# Patient Record
Sex: MALE | Race: OTHER | NOT HISPANIC OR LATINO | URBAN - METROPOLITAN AREA
[De-identification: names, ages, dates, MRNs, and addresses within clinical notes are randomized per-mention and may not be internally consistent; named-entity substitution may affect disease eponyms.]

---

## 2019-05-28 ENCOUNTER — EMERGENCY (EMERGENCY)
Facility: HOSPITAL | Age: 39
LOS: 1 days | Discharge: HOME | End: 2019-05-28
Attending: EMERGENCY MEDICINE | Admitting: EMERGENCY MEDICINE
Payer: COMMERCIAL

## 2019-05-28 VITALS
DIASTOLIC BLOOD PRESSURE: 75 MMHG | OXYGEN SATURATION: 99 % | HEART RATE: 92 BPM | TEMPERATURE: 98 F | SYSTOLIC BLOOD PRESSURE: 145 MMHG | RESPIRATION RATE: 18 BRPM | WEIGHT: 166.89 LBS

## 2019-05-28 LAB
ANION GAP SERPL CALC-SCNC: 8 MMOL/L — LOW (ref 9–16)
BASOPHILS NFR BLD AUTO: 0.8 % — SIGNIFICANT CHANGE UP (ref 0–2)
BUN SERPL-MCNC: 15 MG/DL — SIGNIFICANT CHANGE UP (ref 7–23)
CALCIUM SERPL-MCNC: 8.5 MG/DL — SIGNIFICANT CHANGE UP (ref 8.5–10.5)
CHLORIDE SERPL-SCNC: 105 MMOL/L — SIGNIFICANT CHANGE UP (ref 96–108)
CO2 SERPL-SCNC: 28 MMOL/L — SIGNIFICANT CHANGE UP (ref 22–31)
CREAT SERPL-MCNC: 1.23 MG/DL — SIGNIFICANT CHANGE UP (ref 0.5–1.3)
EOSINOPHIL NFR BLD AUTO: 0.9 % — SIGNIFICANT CHANGE UP (ref 0–6)
GLUCOSE SERPL-MCNC: 134 MG/DL — HIGH (ref 70–99)
HCT VFR BLD CALC: 44.2 % — SIGNIFICANT CHANGE UP (ref 39–50)
HGB BLD-MCNC: 14.9 G/DL — SIGNIFICANT CHANGE UP (ref 13–17)
IMM GRANULOCYTES NFR BLD AUTO: 0.3 % — SIGNIFICANT CHANGE UP (ref 0–1.5)
LYMPHOCYTES # BLD AUTO: 27.1 % — SIGNIFICANT CHANGE UP (ref 13–44)
MCHC RBC-ENTMCNC: 27.3 PG — SIGNIFICANT CHANGE UP (ref 27–34)
MCHC RBC-ENTMCNC: 33.7 G/DL — SIGNIFICANT CHANGE UP (ref 32–36)
MCV RBC AUTO: 81 FL — SIGNIFICANT CHANGE UP (ref 80–100)
MONOCYTES NFR BLD AUTO: 8 % — SIGNIFICANT CHANGE UP (ref 2–14)
NEUTROPHILS NFR BLD AUTO: 62.9 % — SIGNIFICANT CHANGE UP (ref 43–77)
PLATELET # BLD AUTO: 174 K/UL — SIGNIFICANT CHANGE UP (ref 150–400)
POTASSIUM SERPL-MCNC: 3.9 MMOL/L — SIGNIFICANT CHANGE UP (ref 3.5–5.3)
POTASSIUM SERPL-SCNC: 3.9 MMOL/L — SIGNIFICANT CHANGE UP (ref 3.5–5.3)
RBC # BLD: 5.46 M/UL — SIGNIFICANT CHANGE UP (ref 4.2–5.8)
RBC # FLD: 13.6 % — SIGNIFICANT CHANGE UP (ref 10.3–14.5)
SODIUM SERPL-SCNC: 141 MMOL/L — SIGNIFICANT CHANGE UP (ref 132–145)
TROPONIN I SERPL-MCNC: <0.017 NG/ML — LOW (ref 0.02–0.06)
WBC # BLD: 8.7 K/UL — SIGNIFICANT CHANGE UP (ref 3.8–10.5)
WBC # FLD AUTO: 8.7 K/UL — SIGNIFICANT CHANGE UP (ref 3.8–10.5)

## 2019-05-28 PROCEDURE — 71046 X-RAY EXAM CHEST 2 VIEWS: CPT | Mod: 26

## 2019-05-28 PROCEDURE — 99284 EMERGENCY DEPT VISIT MOD MDM: CPT | Mod: 25

## 2019-05-28 PROCEDURE — 93010 ELECTROCARDIOGRAM REPORT: CPT

## 2019-05-28 RX ORDER — ALPRAZOLAM 0.25 MG
0.5 TABLET ORAL ONCE
Refills: 0 | Status: DISCONTINUED | OUTPATIENT
Start: 2019-05-28 | End: 2019-05-28

## 2019-05-28 RX ADMIN — Medication 0.5 MILLIGRAM(S): at 13:58

## 2019-05-28 NOTE — ED PROVIDER NOTE - NSFOLLOWUPINSTRUCTIONS_ED_ALL_ED_FT
Log Out.    [employer] Care[employer]®     :  [facility]             CHEST PAIN - AfterCare(R) Instructions(ER/ED)     Chest Pain    WHAT YOU NEED TO KNOW:    Chest pain can be caused by a range of conditions, from not serious to life-threatening. Chest pain can be a symptom of a digestive problem, such as acid reflux or a stomach ulcer. An anxiety attack or a strong emotion, such as anger, can also cause chest pain. Infection, inflammation, or a fracture in the bones or cartilage in your chest can cause pain or discomfort. Sometimes chest pain or pressure is caused by poor blood flow to your heart (angina). Chest pain may also be caused by life-threatening conditions such as a heart attack or blood clot in your lungs.     DISCHARGE INSTRUCTIONS:    Call 911 if:     You have any of the following signs of a heart attack:   Squeezing, pressure, or pain in your chest      You may also have any of the following:   Discomfort or pain in your back, neck, jaw, stomach, or arm      Shortness of breath      Nausea or vomiting      Lightheadedness or a sudden cold sweat        Return to the emergency department if:     You have chest discomfort that gets worse, even with medicine.      You cough or vomit blood.       Your bowel movements are black or bloody.       You cannot stop vomiting, or it hurts to swallow.     Contact your healthcare provider if:     You have questions or concerns about your condition or care.        Medicines:     Medicines may be given to treat the cause of your chest pain. Examples include pain medicine, anxiety medicine, or medicines to increase blood flow to your heart.       Do not take certain medicines without asking your healthcare provider first. These include NSAIDs, herbal or vitamin supplements, or hormones (estrogen or progestin).       Take your medicine as directed. Contact your healthcare provider if you think your medicine is not helping or if you have side effects. Tell him or her if you are allergic to any medicine. Keep a list of the medicines, vitamins, and herbs you take. Include the amounts, and when and why you take them. Bring the list or the pill bottles to follow-up visits. Carry your medicine list with you in case of an emergency.    Follow up with your healthcare provider within 72 hours, or as directed: You may need to return for more tests to find the cause of your chest pain. You may be referred to a specialist, such as a cardiologist or gastroenterologist. Write down your questions so you remember to ask them during your visits.     Healthy living tips: The following are general healthy guidelines. If your chest pain is caused by a heart problem, your healthcare provider will give you specific guidelines to follow.    Do not smoke. Nicotine and other chemicals in cigarettes and cigars can cause lung and heart damage. Ask your healthcare provider for information if you currently smoke and need help to quit. E-cigarettes or smokeless tobacco still contain nicotine. Talk to your healthcare provider before you use these products.       Eat a variety of healthy, low-fat, low-salt foods. Healthy foods include fruits, vegetables, whole-grain breads, low-fat dairy products, beans, lean meats, and fish. Ask for more information about a heart healthy diet.      Drink plenty of water every day. Your body is made of mostly water. Water helps your body to control your temperature and blood pressure. Ask your healthcare provider how much water you should drink every day.      Ask about activity. Your healthcare provider will tell you which activities to limit or avoid. Ask when you can drive, return to work, and have sex. Ask about the best exercise plan for you.      Maintain a healthy weight. Ask your healthcare provider how much you should weigh. Ask him or her to help you create a weight loss plan if you are overweight.       Get the flu and pneumonia vaccines. All adults should get the influenza (flu) vaccine. Get it every year as soon as it becomes available. The pneumococcal vaccine is given to adults aged 65 years or older. The vaccine is given every 5 years to prevent pneumococcal disease, such as pneumonia.    If you have a stent:     Carry your stent card with you at all times.       Let all healthcare providers know that you have a stent.

## 2019-05-28 NOTE — ED PROVIDER NOTE - OBJECTIVE STATEMENT
37 y/o male with PMHx of anxiety (on Fluoxetine) presents to ED c/o CP. Patient reports intermittent sternal CP for the past 4 days, sometimes as pressure and other times as sharp pain, with associated SOB and palpitations (rapid heart beat), which is worse with movement. Also reports non-productive cough and nausea without vomiting. Denies any fever, leg swelling or pain, or recent travel. States symptoms feel similar to panic attacks in the past. Patient is on Fluoxetine for anxiety (no recent dose changes) and states he is getting intermittent bouts of diarrhea due to this. Admits to smoking cigarettes and drinking beer, no recent increased alcohol or tobacco use.

## 2019-05-28 NOTE — ED PROVIDER NOTE - CLINICAL SUMMARY MEDICAL DECISION MAKING FREE TEXT BOX
NSR on EKG w/out ischemic changes, CXR wnl, CBC/CMP wnl, neg trop, PERC negative w/+ hx of anxiety and depression, not feeling any sx improvement after 6mo of daily fluoxetine. Sx likely 2/2 stress/mood disorder given lack of risk factors and negative workup. Sx also improved s/p anxiolysis in ED. Has close relationship with psychiatrist, will f/u within the week. Also given f/u with cardiology should sx persist. No indication for inpt workup for further testing at this time.

## 2019-05-28 NOTE — ED ADULT NURSE NOTE - CHIEF COMPLAINT QUOTE
Pt presents to ED c/o midsternal nonradiating intermittent chest pain x4 days with SOB during episodes. Denies any recent travel, no cardiac hx, no fever/chills, no N/V/D, no fever/chills, no peripheral edema. Began Fluoxetine [id number] ago.

## 2019-05-28 NOTE — ED PROVIDER NOTE - PHYSICAL EXAMINATION
VITAL SIGNS: I have reviewed nursing notes and confirm.  CONSTITUTIONAL: Well-developed; well-nourished; in no acute distress.  SKIN: Agree with RN documentation regarding decubitus evaluation. Remainder of skin exam is warm and dry, no acute rash.  HEAD: Normocephalic; atraumatic.  EYES: PERRL, EOM intact; conjunctiva and sclera clear.  ENT: No nasal discharge; airway clear.  NECK: Supple; non tender.  CARD: S1, S2 normal; no murmurs, gallops, or rubs. Regular rate and rhythm.  RESP: No wheezes, rales or rhonchi.  ABD: Normal bowel sounds; soft; non-distended; non-tender; no hepatosplenomegaly.  EXT: Normal ROM. No clubbing, cyanosis or edema.  LYMPH: No acute cervical adenopathy.  NEURO: Alert, oriented. Grossly unremarkable.  PSYCH: Cooperative, appropriate. Patient appears mildly anxious.

## 2019-05-28 NOTE — ED PROVIDER NOTE - CARE PROVIDER_API CALL
Alexi Sainz)  Cardiovascular Disease  [address] [facility] [place] [address]  Phone: [phone]  Fax: [phone]  Follow Up Time:

## 2019-05-28 NOTE — ED ADULT TRIAGE NOTE - CHIEF COMPLAINT QUOTE
Pt presents to ED c/o midsternal nonradiating intermittent chest pain x4 days with SOB during episodes. Denies any recent travel, no cardiac hx, no fever/chills, no N/V/D, no fever/chills, no peripheral edema. Began Fluoxetine x1swevec ago.

## 2019-06-01 DIAGNOSIS — J45.909 UNSPECIFIED ASTHMA, UNCOMPLICATED: ICD-10-CM

## 2019-06-01 DIAGNOSIS — Z79.51 LONG TERM (CURRENT) USE OF INHALED STEROIDS: ICD-10-CM

## 2019-06-01 DIAGNOSIS — R07.89 OTHER CHEST PAIN: ICD-10-CM

## 2019-06-01 DIAGNOSIS — Z79.52 LONG TERM (CURRENT) USE OF SYSTEMIC STEROIDS: ICD-10-CM

## 2019-06-01 DIAGNOSIS — F17.200 NICOTINE DEPENDENCE, UNSPECIFIED, UNCOMPLICATED: ICD-10-CM

## 2019-07-16 ENCOUNTER — EMERGENCY (EMERGENCY)
Facility: HOSPITAL | Age: 39
LOS: 1 days | Discharge: HOME | End: 2019-07-16
Attending: EMERGENCY MEDICINE | Admitting: EMERGENCY MEDICINE
Payer: COMMERCIAL

## 2019-07-16 VITALS
TEMPERATURE: 99 F | OXYGEN SATURATION: 100 % | DIASTOLIC BLOOD PRESSURE: 80 MMHG | WEIGHT: 179.9 LBS | RESPIRATION RATE: 17 BRPM | HEIGHT: 68 IN | SYSTOLIC BLOOD PRESSURE: 127 MMHG | HEART RATE: 85 BPM

## 2019-07-16 DIAGNOSIS — R07.89 OTHER CHEST PAIN: ICD-10-CM

## 2019-07-16 DIAGNOSIS — R06.02 SHORTNESS OF BREATH: ICD-10-CM

## 2019-07-16 DIAGNOSIS — Z79.899 OTHER LONG TERM (CURRENT) DRUG THERAPY: ICD-10-CM

## 2019-07-16 DIAGNOSIS — Z79.52 LONG TERM (CURRENT) USE OF SYSTEMIC STEROIDS: ICD-10-CM

## 2019-07-16 DIAGNOSIS — R42 DIZZINESS AND GIDDINESS: ICD-10-CM

## 2019-07-16 DIAGNOSIS — J45.909 UNSPECIFIED ASTHMA, UNCOMPLICATED: ICD-10-CM

## 2019-07-16 DIAGNOSIS — R05 COUGH: ICD-10-CM

## 2019-07-16 PROBLEM — F41.9 ANXIETY DISORDER, UNSPECIFIED: Chronic | Status: ACTIVE | Noted: 2019-05-28

## 2019-07-16 PROCEDURE — 99284 EMERGENCY DEPT VISIT MOD MDM: CPT

## 2019-07-16 RX ORDER — AZITHROMYCIN 500 MG/1
500 TABLET, FILM COATED ORAL ONCE
Refills: 0 | Status: COMPLETED | OUTPATIENT
Start: 2019-07-16 | End: 2019-07-16

## 2019-07-16 RX ORDER — DEXAMETHASONE 0.5 MG/5ML
10 ELIXIR ORAL ONCE
Refills: 0 | Status: COMPLETED | OUTPATIENT
Start: 2019-07-16 | End: 2019-07-16

## 2019-07-16 RX ORDER — IPRATROPIUM/ALBUTEROL SULFATE 18-103MCG
3 AEROSOL WITH ADAPTER (GRAM) INHALATION ONCE
Refills: 0 | Status: COMPLETED | OUTPATIENT
Start: 2019-07-16 | End: 2019-07-16

## 2019-07-16 RX ORDER — AZITHROMYCIN 500 MG/1
1 TABLET, FILM COATED ORAL
Qty: 4 | Refills: 0
Start: 2019-07-16 | End: 2019-07-19

## 2019-07-16 RX ORDER — ACETAMINOPHEN 500 MG
650 TABLET ORAL ONCE
Refills: 0 | Status: COMPLETED | OUTPATIENT
Start: 2019-07-16 | End: 2019-07-16

## 2019-07-16 RX ORDER — ALBUTEROL 90 UG/1
2 AEROSOL, METERED ORAL
Qty: 1 | Refills: 0
Start: 2019-07-16 | End: 2019-07-20

## 2019-07-16 RX ADMIN — Medication 3 MILLILITER(S): at 16:07

## 2019-07-16 RX ADMIN — AZITHROMYCIN 500 MILLIGRAM(S): 500 TABLET, FILM COATED ORAL at 16:07

## 2019-07-16 RX ADMIN — Medication 10 MILLIGRAM(S): at 16:16

## 2019-07-16 RX ADMIN — Medication 650 MILLIGRAM(S): at 16:06

## 2019-07-16 NOTE — ED ADULT TRIAGE NOTE - CHIEF COMPLAINT QUOTE
Patient presents to ED with c/o cough for 3 days with productive green sputum. Denies fever, chills or sick contacts. PT reports chest pain with cough.

## 2019-07-16 NOTE — ED PROVIDER NOTE - OBJECTIVE STATEMENT
39 y.o M with PMHx asthma presents to the ED with c/o productive cough for the past 3 days. Describes phlegm as a light green color. Notes some chest tightness and SOB secondary to coughing. Has been using Albuterol with some relief of symptoms. Notes body aches and lightheadedness but otherwise denies fever, chills, N/V, abdominal pain, nasal congestion, headache. Denies recent sick contacts.

## 2019-07-16 NOTE — ED PROVIDER NOTE - CLINICAL SUMMARY MEDICAL DECISION MAKING FREE TEXT BOX
Will treat symptomatically with inhaler and 1 dose of Decadron. Will start abx given high suspicion for bacterial infection.

## 2019-07-22 ENCOUNTER — EMERGENCY (EMERGENCY)
Facility: HOSPITAL | Age: 39
LOS: 1 days | Discharge: HOME | End: 2019-07-22
Attending: EMERGENCY MEDICINE | Admitting: EMERGENCY MEDICINE
Payer: COMMERCIAL

## 2019-07-22 VITALS
WEIGHT: 167.99 LBS | OXYGEN SATURATION: 95 % | RESPIRATION RATE: 18 BRPM | HEART RATE: 61 BPM | SYSTOLIC BLOOD PRESSURE: 118 MMHG | TEMPERATURE: 98 F | DIASTOLIC BLOOD PRESSURE: 77 MMHG

## 2019-07-22 VITALS
TEMPERATURE: 98 F | SYSTOLIC BLOOD PRESSURE: 117 MMHG | DIASTOLIC BLOOD PRESSURE: 88 MMHG | RESPIRATION RATE: 17 BRPM | OXYGEN SATURATION: 97 % | HEART RATE: 66 BPM

## 2019-07-22 PROCEDURE — 99284 EMERGENCY DEPT VISIT MOD MDM: CPT

## 2019-07-22 RX ORDER — IBUPROFEN 200 MG
600 TABLET ORAL ONCE
Refills: 0 | Status: COMPLETED | OUTPATIENT
Start: 2019-07-22 | End: 2019-07-22

## 2019-07-22 RX ORDER — IPRATROPIUM/ALBUTEROL SULFATE 18-103MCG
3 AEROSOL WITH ADAPTER (GRAM) INHALATION ONCE
Refills: 0 | Status: COMPLETED | OUTPATIENT
Start: 2019-07-22 | End: 2019-07-22

## 2019-07-22 RX ADMIN — Medication 50 MILLIGRAM(S): at 13:51

## 2019-07-22 RX ADMIN — Medication 3 MILLILITER(S): at 13:20

## 2019-07-22 RX ADMIN — Medication 3 MILLILITER(S): at 13:52

## 2019-07-22 RX ADMIN — Medication 3 MILLILITER(S): at 13:40

## 2019-07-22 RX ADMIN — Medication 600 MILLIGRAM(S): at 13:50

## 2019-07-22 NOTE — ED ADULT NURSE NOTE - NSIMPLEMENTINTERV_GEN_ALL_ED
Implemented All Universal Safety Interventions:  [place] to call system. Call bell, personal items and telephone within reach. Instruct patient to call for assistance. Room bathroom lighting operational. Non-slip footwear when patient is off stretcher. Physically safe environment: no spills, clutter or unnecessary equipment. Stretcher in lowest position, wheels locked, appropriate side rails in place.

## 2019-07-22 NOTE — ED PROVIDER NOTE - PROGRESS NOTE DETAILS
Lungs clear to auscultation. Pt feels much better. Pt received spacer and was able to show how to use it appropriately. Patient informed of ED visit findings, understands plan.  Patient provided with written and further verbal instructions not included in discharge paperwork.  Patient instructed to follow up with their primary care physician in 2-3 days and return for new, worsened, or persistent symptoms.

## 2019-07-22 NOTE — ED ADULT TRIAGE NOTE - CHIEF COMPLAINT QUOTE
Pt returns to ED c/o worsening productive cough with green phlegm, SOB and wheezing. Pt was seen in ED x3 days ago and px Zpack, reports symptoms worsened. Pt has hx of asthma, reports using rescue inhaler and nebulizer at home with little relief. Pt denies any fever/chills, no recent travel or recent sick contacts.

## 2019-07-22 NOTE — ED PROVIDER NOTE - CLINICAL SUMMARY MEDICAL DECISION MAKING FREE TEXT BOX
39M asthma with wheezing, cough, sore throat c/w respiratory infection. S/p azithromycin. Normal CXR 3d ago with unremarkable labs. Wheezing, will give 3 duonebs, prednisone and reassess. No focal findings on exam. No erythema or exudates in pharynx. Appears well, not in acute distress. Discussed spacer use with MDI and cessation of smoking. Pt verbalized understanding.

## 2019-07-22 NOTE — ED PROVIDER NOTE - ATTENDING CONTRIBUTION TO CARE
Pt is a 40yo M with a h/o asthma (no recent hospitalizations or intubations).  Pt p/w productive cough and URI sxs.  Recent visit for asthma and cough.  Treated with Z-lillie.  Minimal improvement of wheezing with MDI.  +Smoker.   PE - agree with resident exam as above.   A/P - URI with asthma exacerbation.   Patient received nebulizer treatments in the ED and feels much better.  Reauscultation shows very clear lungs, no use of accessory muscles of respiration, and the patient is breathing comfortably.  No evidence suggests ACS/PE or PTX as etiologies - this episode is c/w known h/o asthma, same symptoms and presentation.  Looks very well, appropriate for o/p treatment, normal O2 sat.  Patient will f/u with PMD.  Patient has no w/r/r and is comfortable and agrees with d/c plan.  No other complaints or cp/sob on discharge.

## 2019-07-22 NOTE — ED PROVIDER NOTE - NSFOLLOWUPCLINICS_GEN_ALL_ED_FT
[facility]  Family Medicine  [address] [place] [address]  Phone: [phone]  Fax:   Follow Up Time:     [address]  [address] [address]  Phone: [phone]  Fax: [phone]  Follow Up Time:     [facility]  [address] [place] [address]  Phone: [phone]  Fax: [phone]  Follow Up Time:     [facility] - Primary Care  Primary Care  [address] [place] [address]  Phone: [phone]  Fax:   Follow Up Time:

## 2019-07-22 NOTE — ED PROVIDER NOTE - NSFOLLOWUPINSTRUCTIONS_ED_ALL_ED_FT
Please follow up with your primary physician in 24-48 hr.  Seek immediate medical care for any new/worsening signs or symptoms.  Please take 600 mg of Ibuprofen (aka Motrin, Advil) and/or 650 mg Acetaminophen (aka Tylenol) every 6 hours, as needed, for mild-moderate pain.    Please do not take these medications if you do not have pain or if you have any history of bleeding disorders, kidney or liver disease.   Do not use ibuprofen if you are on blood thinners (anti-coagulation).  -- Follow up with your primary care doctor within 2 days.    -- Rest, increase fluids.   -- Fill the prescriptions and take as directed.  -- Use the albuterol inhaler, 2 puffs every 4-6 hours as needed for 2 days and then as needed after that.  Remember, technique is important: shake the inhaler, insert into spacer, breath out fully, then pump the inhaler and take  several slow deep breaths through the spacer aerochamber with your mouth.  Always use the spacer aerochamber whenever you use the inhaler.  -- Take all of your other medications as previously prescribed.  -- Any worsening wheezing, shortness of breath, chest pain, fever, chills return to the ED.

## 2019-07-26 DIAGNOSIS — R05 COUGH: ICD-10-CM

## 2019-07-26 DIAGNOSIS — J45.901 UNSPECIFIED ASTHMA WITH (ACUTE) EXACERBATION: ICD-10-CM

## 2021-05-24 NOTE — ED PROVIDER NOTE - ASSOCIATED PAIN OR INJURY
This encounter was created in error - please disregard.
no discrete location documentation necessary

## 2022-09-28 NOTE — ED PROVIDER NOTE - OBJECTIVE STATEMENT
38yo male pmh asthma (hospitalizations as child, none in last 10 years) p/w cough productive green sputum, nasal congestion, sore throat x 1 week. Treated with Z pack 3d ago in ED without improvement. Uses MDI inhaler q4h with minimal improvement. Denies fevers, n/v/d, abd pain, rash, sick contacts, recent travel. Smokes 1 pack every 4 days, none in last 3 days. Albendazole Counseling:  I discussed with the patient the risks of albendazole including but not limited to cytopenia, kidney damage, nausea/vomiting and severe allergy.  The patient understands that this medication is being used in an off-label manner.
